# Patient Record
Sex: MALE | URBAN - METROPOLITAN AREA
[De-identification: names, ages, dates, MRNs, and addresses within clinical notes are randomized per-mention and may not be internally consistent; named-entity substitution may affect disease eponyms.]

---

## 2019-11-22 ENCOUNTER — TELEPHONE (OUTPATIENT)
Dept: TRANSPLANT | Facility: CLINIC | Age: 29
End: 2019-11-22

## 2019-11-22 NOTE — TELEPHONE ENCOUNTER
Roberto Mcconnell called and stated that he is interested in becoming a living donor for his aunt, Mary Galvan.  Medical and social history obtained.  No contraindications noted.  All questions answered.  Kidney donor information book (in [place] obtained from [facility] website) emailed to international department to forward to patient for review. Instructed to contact me with questions or to schedule testing. [place] application for [employer] also emailed to patient.  Patient verbalized understanding.    Phone interview obtain using international , Alecia Herrera.

## 2020-05-25 ENCOUNTER — DOCUMENTATION ONLY (OUTPATIENT)
Dept: TRANSPLANT | Facility: CLINIC | Age: 30
End: 2020-05-25